# Patient Record
Sex: FEMALE | URBAN - METROPOLITAN AREA
[De-identification: names, ages, dates, MRNs, and addresses within clinical notes are randomized per-mention and may not be internally consistent; named-entity substitution may affect disease eponyms.]

---

## 2021-05-18 ENCOUNTER — APPOINTMENT (RX ONLY)
Dept: URBAN - METROPOLITAN AREA CLINIC 91 | Facility: CLINIC | Age: 76
Setting detail: DERMATOLOGY
End: 2021-05-18

## 2021-05-18 DIAGNOSIS — L64.8 OTHER ANDROGENIC ALOPECIA: ICD-10-CM | Status: INADEQUATELY CONTROLLED

## 2021-05-18 DIAGNOSIS — L91.8 OTHER HYPERTROPHIC DISORDERS OF THE SKIN: ICD-10-CM

## 2021-05-18 DIAGNOSIS — B35.1 TINEA UNGUIUM: ICD-10-CM | Status: INADEQUATELY CONTROLLED

## 2021-05-18 DIAGNOSIS — L82.1 OTHER SEBORRHEIC KERATOSIS: ICD-10-CM | Status: STABLE

## 2021-05-18 PROCEDURE — ? TREATMENT REGIMEN

## 2021-05-18 PROCEDURE — ? COUNSELING

## 2021-05-18 PROCEDURE — ? FULL BODY SKIN EXAM - DECLINED

## 2021-05-18 PROCEDURE — ? PRESCRIPTION

## 2021-05-18 PROCEDURE — 99203 OFFICE O/P NEW LOW 30 MIN: CPT

## 2021-05-18 PROCEDURE — ? ADDITIONAL NOTES

## 2021-05-18 RX ORDER — CICLOPIROX 7.7 MG/G
GEL TOPICAL
Qty: 1 | Refills: 3 | Status: ERX | COMMUNITY
Start: 2021-05-18

## 2021-05-18 RX ADMIN — CICLOPIROX: 7.7 GEL TOPICAL at 00:00

## 2021-05-18 ASSESSMENT — LOCATION DETAILED DESCRIPTION DERM
LOCATION DETAILED: STERNAL NOTCH
LOCATION DETAILED: LEFT INFERIOR ANTERIOR NECK
LOCATION DETAILED: LEFT RIB CAGE
LOCATION DETAILED: LOWER STERNUM
LOCATION DETAILED: LEFT DISTAL PLANTAR 2ND TOE
LOCATION DETAILED: RIGHT RIB CAGE
LOCATION DETAILED: MID-FRONTAL SCALP

## 2021-05-18 ASSESSMENT — LOCATION SIMPLE DESCRIPTION DERM
LOCATION SIMPLE: CHEST
LOCATION SIMPLE: ABDOMEN
LOCATION SIMPLE: LEFT ANTERIOR NECK
LOCATION SIMPLE: ANTERIOR SCALP
LOCATION SIMPLE: LEFT 2ND TOE

## 2021-05-18 ASSESSMENT — LOCATION ZONE DERM
LOCATION ZONE: TRUNK
LOCATION ZONE: TOE
LOCATION ZONE: SCALP
LOCATION ZONE: NECK

## 2021-05-18 NOTE — PROCEDURE: TREATMENT REGIMEN
Plan: Treatment goal is hair regrowth. Patient is not at treatment goal.
Initiate Treatment: ProPil oral supplements
Detail Level: Zone
Otc Regimen: Minoxidil 5% topical foam: Apply to dry scalp once a day. Rinse hands thoroughly after application.

## 2021-05-18 NOTE — PROCEDURE: ADDITIONAL NOTES
Additional Notes: Discussed cosmetic removal with patient \\n$65 for #1-3\\n$150 for #4-15
Render Risk Assessment In Note?: no
Detail Level: Simple
Additional Notes: Discussed different treatment options with the patient: \\n1. OTC Rogaine. Normally recommends using everyday for 3-6 months and then titrate application. \\n2. ProPil Oral supplements. Patient takes 3 tablets/day for at least 3 months.\\n3. ProScriptix DensiFi FX Volumizing Shampoo and Conditioner. \\n4. Platelet-Rich Plasma (PRP) Treatment:\\n- Includes drawing patient's blood, processing into platelet-rich plasma, and injecting into the scalp. \\n- First 3 treatments are 4-6 weeks apart, with the 4th and final treatment at 6 months. \\n- Quoted at $700/treatment for a total of $2800 for 1 year of treatments.
Render Risk Assessment In Note?: yes
Additional Notes: Discussed cosmetic removal with patient \\n$65 for #1-3\\n$150 for #4-15